# Patient Record
Sex: FEMALE | Race: WHITE | NOT HISPANIC OR LATINO | Employment: UNEMPLOYED | URBAN - NONMETROPOLITAN AREA
[De-identification: names, ages, dates, MRNs, and addresses within clinical notes are randomized per-mention and may not be internally consistent; named-entity substitution may affect disease eponyms.]

---

## 2017-02-18 ENCOUNTER — OFFICE VISIT (OUTPATIENT)
Dept: RETAIL CLINIC | Facility: CLINIC | Age: 42
End: 2017-02-18

## 2017-02-18 VITALS — OXYGEN SATURATION: 98 % | WEIGHT: 231 LBS | TEMPERATURE: 98.9 F | HEART RATE: 101 BPM | RESPIRATION RATE: 18 BRPM

## 2017-02-18 DIAGNOSIS — R21 RASH: Primary | ICD-10-CM

## 2017-02-18 PROCEDURE — 99202 OFFICE O/P NEW SF 15 MIN: CPT | Performed by: NURSE PRACTITIONER

## 2017-02-18 NOTE — PROGRESS NOTES
Rash      Subjective   Chaparrita Reynoso is a 41 y.o. female.     Rash   This is a new problem. Episode onset: 3 days ago  The rash is characterized by itchiness and dryness. She was exposed to nothing. Pertinent negatives include no congestion, cough, diarrhea, fatigue, fever, shortness of breath, sore throat or vomiting. Treatments tried: Used Lotrimin cream last night.   The treatment provided no relief.       There is no problem list on file for this patient.      Allergies   Allergen Reactions   • Metformin And Related GI Intolerance        No current outpatient prescriptions on file prior to visit.     No current facility-administered medications on file prior to visit.        Past Medical History   Diagnosis Date   • Cancer      uterine   • Type 2 diabetes mellitus        Past Surgical History   Procedure Laterality Date   • Hysterectomy  07/2015   • Cholecystectomy  02/2000   • Total abdominal hysterectomy with salpingo oophorectomy         Family History   Problem Relation Age of Onset   • No Known Problems Mother    • No Known Problems Father    • No Known Problems Brother        Social History     Social History   • Marital status:      Spouse name: N/A   • Number of children: N/A   • Years of education: N/A     Occupational History   • Not on file.     Social History Main Topics   • Smoking status: Never Smoker   • Smokeless tobacco: Never Used   • Alcohol use No   • Drug use: No   • Sexual activity: Defer     Other Topics Concern   • Not on file     Social History Narrative   • No narrative on file       Travel:  No recent travel within the last 21 days outside the [place] Denies recent travel to one of the following [place]ries:  [place], [place], [place], or [place].  Denies contact with anyone who has traveled to one of the following [place]  Countries: [place], [place], [place], or [place] within the last 21 days and is known or suspected to have Ebola.  Denies having had any  contact with the human remains, blood or any bodily fluids of someone who is known or suspected to have Ebola within the last 21 days.     OB History     No data available          Review of Systems   Constitutional: Negative for chills, diaphoresis, fatigue and fever.   HENT: Negative for congestion, ear discharge, ear pain, mouth sores, nosebleeds, postnasal drip, sneezing, sore throat and voice change.    Respiratory: Negative for cough, shortness of breath and wheezing.    Cardiovascular: Negative for chest pain.   Gastrointestinal: Negative for abdominal pain, diarrhea, nausea and vomiting.   Musculoskeletal: Negative for myalgias.   Skin: Positive for rash.   Neurological: Negative for dizziness and headaches.       Visit Vitals   • Pulse 101   • Temp 98.9 °F (37.2 °C) (Oral)   • Resp 18   • Wt 231 lb (105 kg)   • LMP 07/01/2015 (Approximate)   • SpO2 98%   • Breastfeeding No       Objective   Physical Exam   Constitutional: She is oriented to person, place, and time. She appears well-developed and well-nourished. She is cooperative. She does not appear ill. No distress.   Obese    Cardiovascular: Regular rhythm and normal heart sounds.  Tachycardia present.    Pulmonary/Chest: Effort normal and breath sounds normal.   Neurological: She is alert and oriented to person, place, and time.   Skin: Skin is warm, dry and intact.            Assessment/Plan   Chaparrita was seen today for rash.    Diagnoses and all orders for this visit:    Rash    Other orders  -     triamcinolone (KENALOG) 0.1 % ointment; Apply  topically 3 (Three) Times a Day for 7 days.    Discussed that these appear to be new stretch marks forming.  Advised her to moisturize her skin liberally and to use the steroid cream if itching.      No results found for this or any previous visit.    Return if symptoms worsen or fail to improve.

## 2017-02-18 NOTE — PATIENT INSTRUCTIONS
Rash  A rash is a change in the color of the skin. A rash can also change the way your skin feels. There are many different conditions and factors that can cause a rash.  HOME CARE INSTRUCTIONS  Pay attention to any changes in your symptoms. Follow these instructions to help with your condition:   Medicine  Take or apply over-the-counter and prescription medicines only as told by your health care provider. These may include:  · Corticosteroid cream.  · Anti-itch lotions.  · Oral antihistamines.  Skin Care  · Apply cool compresses to the affected areas.  · Try taking a bath with:    Epsom salts. Follow the instructions on the packaging. You can get these at your local pharmacy or grocery store.    Baking soda. Pour a small amount into the bath as told by your health care provider.    Colloidal oatmeal. Follow the instructions on the packaging. You can get this at your local pharmacy or grocery store.  · Try applying baking soda paste to your skin. Stir water into baking soda until it reaches a paste-like consistency.  · Do not scratch or rub your skin.  · Avoid covering the rash. Make sure the rash is exposed to air as much as possible.  General Instructions  · Avoid hot showers or baths, which can make itching worse. A cold shower may help.  · Avoid scented soaps, detergents, and perfumes. Use gentle soaps, detergents, perfumes, and other cosmetic products.  · Avoid any substance that causes your rash. Keep a journal to help track what causes your rash. Write down:    What you eat.    What cosmetic products you use.    What you drink.    What you wear. This includes jewelry.  · Keep all follow-up visits as told by your health care provider. This is important.  SEEK MEDICAL CARE IF:  · You sweat at night.  · You lose weight.  · You urinate more than normal.  · You feel weak.  · You vomit.  · Your skin or the whites of your eyes look yellow (jaundice).  · Your skin:    Tingles.    Is numb.  · Your rash:    Does not go  "away after several days.    Gets worse.  · You are:    Unusually thirsty.    More tired than normal.  · You have:    New symptoms.    Pain in your abdomen.    A fever.    Diarrhea.  SEEK IMMEDIATE MEDICAL CARE IF:  · You develop a rash that covers all or most of your body. The rash may or may not be painful.  · You develop blisters that:    Are on top of the rash.    Grow larger or grow together.    Are painful.    Are inside your nose or mouth.  · You develop a rash that:    Looks like purple pinprick-sized spots all over your body.    Has a \"bull's eye\" or looks like a target.    Is not related to sun exposure, is red and painful, and causes your skin to peel.     This information is not intended to replace advice given to you by your health care provider. Make sure you discuss any questions you have with your health care provider.     Document Released: 12/08/2003 Document Revised: 11/28/2016 Document Reviewed: 05/04/2016  [employer] Interactive Patient Education ©2016 [employer]    "

## 2023-11-03 ENCOUNTER — TELEPHONE (OUTPATIENT)
Dept: OBSTETRICS AND GYNECOLOGY | Facility: CLINIC | Age: 48
End: 2023-11-03

## 2023-11-03 NOTE — TELEPHONE ENCOUNTER
Caller: Chaparrita Reynoso    Relationship: Self    Best call back number: [phone]    What is the best time to reach you: ANYTIME    Who are you requesting to speak with (clinical staff, provider,  specific staff member): NOT SURE     Do you know the name of the person who called: NO    What was the call regarding: NOT SURE

## 2023-11-08 NOTE — PROGRESS NOTES
[facility] Cardiology   Consult  Chaparrita Reynoso  1975    VISIT DATE:  11/09/23    PCP:   Naima Vasquez MD  [address] [facility] [address]        CC:  Rapid Heart Rate (New Patient)      Problem List:  DM, Type 2  HTN  Hyperlipidemia  PCOS  DJD  HYST      Echo August 2023:  Hyperdynamic LV function, moderate to severe LVH, EF 55 to 60%, no significant valvular disease      Holter:  no significant ectopy or arrhythmias, avg HR 96    History of Present Illness:  Chaparrita Reynoso  Is a 48 y.o. female with pertinent cardiac history detailed above.  Patient was incidentally noted to be tachycardic at routine visits with Dr. Vasquez.  These were episodes of sinus tachycardia.  She wore a Holter monitor which showed an average heart rate of 96 and no significant ectopy or arrhythmias.  She was put on metoprolol to help decrease her heart rate and this has caused some improvement.  She was largely asymptomatic.  She denies chest pain dyspnea palpitations.  Has not had episodes of hypoglycemia or syncope.  TSH and other basic labs were normal.  She does have a significantly elevated hemoglobin A1c.  He had an echo showing normal ejection fraction with LVH but no significant valve disease.      There are no problems to display for this patient.      Allergies   Allergen Reactions    Metformin And Related GI Intolerance       Social History     Socioeconomic History    Marital status:    Tobacco Use    Smoking status: Never    Smokeless tobacco: Never   Substance and Sexual Activity    Alcohol use: No    Drug use: No    Sexual activity: Defer     Birth control/protection: Surgical       Family History   Problem Relation Age of Onset    No Known Problems Mother     No Known Problems Father     No Known Problems Brother        Current Medications:    Current Outpatient Medications:     atorvastatin (LIPITOR) 20 MG tablet, Take 1 tablet by mouth Daily., Disp: 90 tablet, Rfl: 3    Blood Glucose Monitoring  "Suppl (FreeStyle Lite) w/Device kit, See Admin Instructions., Disp: , Rfl:     FREESTYLE LITE test strip, TEST ONCE A DAY, Disp: , Rfl:     Lancets (freestyle) lancets, TEST ONCE A DAY, Disp: , Rfl:     Lantus 100 UNIT/ML injection, INJECT 10 UNITS SUBCUTANEOUSLY EVERY DAY, Disp: , Rfl:     lidocaine (LIDODERM) 5 %, APPLY ONE PATCH FOR 12 HOURS AND REMOVE FOR 12 HOURS, Disp: , Rfl:     metoprolol tartrate (LOPRESSOR) 25 MG tablet, TAKE 1/2 TABLET TWICE DAILY WITH FOOD, Disp: , Rfl:     SURE COMFORT INS SYR .5CC/30G 30G X 5/16\" 0.5 ML misc, USE with insulin injections, Disp: , Rfl:     vitamin D (ERGOCALCIFEROL) 1.25 MG (40482 UT) capsule capsule, Take 1 capsule by mouth 1 (One) Time Per Week., Disp: , Rfl:     SITagliptin (JANUVIA) 100 MG tablet, Take 1 tablet by mouth Daily. (Patient not taking: Reported on 11/9/2023), Disp: , Rfl:      Review of Systems   Cardiovascular:  Negative for chest pain, dyspnea on exertion, irregular heartbeat and palpitations.   Respiratory:  Negative for shortness of breath.        Vitals:    11/09/23 1053   BP: 118/70   BP Location: Left arm   Patient Position: Sitting   Pulse: 102   SpO2: 97%   Weight: 77.1 kg (170 lb)   Height: 166.4 cm (65.5\")       Physical Exam  Constitutional:       Appearance: Normal appearance.   Neck:      Vascular: No carotid bruit.   Cardiovascular:      Rate and Rhythm: Normal rate and regular rhythm.      Pulses: Normal pulses.      Heart sounds: Normal heart sounds.   Pulmonary:      Effort: Pulmonary effort is normal.      Breath sounds: Normal breath sounds.   Musculoskeletal:      Right lower leg: No edema.      Left lower leg: No edema.   Neurological:      General: No focal deficit present.      Mental Status: She is alert.         Diagnostic Data:    ECG 12 Lead    Date/Time: 11/9/2023 11:19 AM  Performed by: Nik Hinson MD    Authorized by: Nik Hinson MD  Comparison: compared with previous ECG from 8/16/2023  Rhythm: sinus " "rhythm  Rate: normal  BPM: 94  QRS axis: normal  Other findings: low voltage        No results found for: \"CHLPL\", \"TRIG\", \"HDL\", \"LDLDIRECT\"  No results found for: \"GLUCOSE\", \"BUN\", \"CREATININE\", \"NA\", \"K\", \"CL\", \"CO2\"  No results found for: \"HGBA1C\"  No results found for: \"WBC\", \"HGB\", \"HCT\", \"PLT\"    Assessment:  No diagnosis found.    Plan:      Hypertension  Creatinine 0.49, potassium 4.6  LVH on echo  Currently controlled on metoprolol alone, continue to monitor  Hyperlipidemia  Total cholesterol 224, triglycerides 215, , HDL 58  Starting lipitor 20mg goal LDL less than 70  Diabetes  A1c 11.7  She is getting a follow up A1c next week  No current angina  Managed by Dr. Vasquez  Elevated heart rates  Consistent with sinus tachycardia  Normal thyroid function  Holter with rare ectopy, average heart rate 96  Normal echo  Agree with continuing low-dose metoprolol at this time.  Patient is asymptomatic      Continue to follow patient for cardiac risk factors.  Encouraged regular exercise and adhering to a heart healthy diet    Advance Care Planning   ACP discussion was held with the patient during this visit. Patient does not have an advance directive, declines further assistance.       Nik Hinson MD FAC     "

## 2023-11-09 ENCOUNTER — OFFICE VISIT (OUTPATIENT)
Dept: CARDIOLOGY | Facility: CLINIC | Age: 48
End: 2023-11-09
Payer: COMMERCIAL

## 2023-11-09 VITALS
SYSTOLIC BLOOD PRESSURE: 118 MMHG | DIASTOLIC BLOOD PRESSURE: 70 MMHG | OXYGEN SATURATION: 97 % | BODY MASS INDEX: 27.32 KG/M2 | HEART RATE: 102 BPM | WEIGHT: 170 LBS | HEIGHT: 66 IN

## 2023-11-09 DIAGNOSIS — R00.0 SINUS TACHYCARDIA: Primary | ICD-10-CM

## 2023-11-09 RX ORDER — LANCETS 28 GAUGE
EACH MISCELLANEOUS
COMMUNITY
Start: 2023-08-23

## 2023-11-09 RX ORDER — LIDOCAINE 50 MG/G
PATCH TOPICAL
COMMUNITY
Start: 2023-10-18

## 2023-11-09 RX ORDER — ERGOCALCIFEROL 1.25 MG/1
50000 CAPSULE ORAL WEEKLY
COMMUNITY
Start: 2023-10-13

## 2023-11-09 RX ORDER — LISINOPRIL 5 MG/1
1 TABLET ORAL DAILY
COMMUNITY
Start: 2023-09-15 | End: 2023-11-09 | Stop reason: ALTCHOICE

## 2023-11-09 RX ORDER — LISINOPRIL 10 MG/1
1 TABLET ORAL DAILY
COMMUNITY
Start: 2023-08-30 | End: 2023-11-09

## 2023-11-09 RX ORDER — BLOOD-GLUCOSE METER
KIT MISCELLANEOUS SEE ADMIN INSTRUCTIONS
COMMUNITY
Start: 2023-08-23

## 2023-11-09 RX ORDER — INSULIN GLARGINE 100 [IU]/ML
INJECTION, SOLUTION SUBCUTANEOUS
COMMUNITY
Start: 2023-08-23

## 2023-11-09 RX ORDER — BLOOD-GLUCOSE METER
KIT MISCELLANEOUS
COMMUNITY
Start: 2023-08-23

## 2023-11-09 RX ORDER — ATORVASTATIN CALCIUM 20 MG/1
20 TABLET, FILM COATED ORAL DAILY
Qty: 90 TABLET | Refills: 3 | Status: SHIPPED | OUTPATIENT
Start: 2023-11-09

## 2023-11-09 RX ORDER — ATORVASTATIN CALCIUM 20 MG/1
20 TABLET, FILM COATED ORAL DAILY
COMMUNITY
End: 2023-11-09 | Stop reason: SDUPTHER

## 2024-04-05 ENCOUNTER — OFFICE VISIT (OUTPATIENT)
Dept: NEUROSURGERY | Facility: CLINIC | Age: 49
End: 2024-04-05
Payer: COMMERCIAL

## 2024-04-05 VITALS — WEIGHT: 184.8 LBS | BODY MASS INDEX: 29.7 KG/M2 | TEMPERATURE: 97.8 F | HEIGHT: 66 IN

## 2024-04-05 DIAGNOSIS — M51.36 DDD (DEGENERATIVE DISC DISEASE), LUMBAR: ICD-10-CM

## 2024-04-05 DIAGNOSIS — M51.16 LUMBAR DISC HERNIATION WITH RADICULOPATHY: Primary | ICD-10-CM

## 2024-04-05 PROCEDURE — 99203 OFFICE O/P NEW LOW 30 MIN: CPT | Performed by: NEUROLOGICAL SURGERY

## 2024-04-05 PROCEDURE — 1160F RVW MEDS BY RX/DR IN RCRD: CPT | Performed by: NEUROLOGICAL SURGERY

## 2024-04-05 PROCEDURE — 1159F MED LIST DOCD IN RCRD: CPT | Performed by: NEUROLOGICAL SURGERY

## 2024-04-05 RX ORDER — MELOXICAM 15 MG/1
15 TABLET ORAL
COMMUNITY
Start: 2024-03-28

## 2024-04-05 RX ORDER — METHOCARBAMOL 750 MG/1
750 TABLET, FILM COATED ORAL
COMMUNITY
Start: 2024-03-28

## 2024-04-05 RX ORDER — FAMOTIDINE 20 MG/1
20 TABLET, FILM COATED ORAL EVERY 12 HOURS
COMMUNITY
Start: 2024-03-07

## 2024-04-05 NOTE — PROGRESS NOTES
Patient: Chaparrita Reynoso  : 1975    Primary Care Provider: Naima Vasquez MD    Requesting Provider: As above        History    Chief Complaint: Back and left leg pain.    History of Present Illness: Ms. Reynoso is a 48-year-old unemployed woman who describes 20 years of intermittent back difficulty.  She tends to have spells.  Her last spell occurred in the summer of last year and was horrible.  She did some therapy.  The pain was extending into the back of her left leg.  When the pain was present the only position that was comfortable was lying down.  Sitting, standing, bending more aggravating.  At this point the pain is let up.    Review of Systems   Constitutional:  Negative for activity change, appetite change, chills, diaphoresis, fatigue, fever and unexpected weight change.   HENT:  Negative for congestion, dental problem, drooling, ear discharge, ear pain, facial swelling, hearing loss, mouth sores, nosebleeds, postnasal drip, rhinorrhea, sinus pressure, sinus pain, sneezing, sore throat, tinnitus, trouble swallowing and voice change.    Eyes:  Negative for photophobia, pain, discharge, redness, itching and visual disturbance.   Respiratory:  Negative for apnea, cough, choking, chest tightness, shortness of breath, wheezing and stridor.    Cardiovascular:  Negative for chest pain, palpitations and leg swelling.   Gastrointestinal:  Negative for abdominal distention, abdominal pain, anal bleeding, blood in stool, constipation, diarrhea, nausea, rectal pain and vomiting.   Endocrine: Negative for cold intolerance, heat intolerance, polydipsia, polyphagia and polyuria.   Genitourinary:  Negative for decreased urine volume, difficulty urinating, dyspareunia, dysuria, enuresis, flank pain, frequency, genital sores, hematuria, menstrual problem, pelvic pain, urgency, vaginal bleeding, vaginal discharge and vaginal pain.   Musculoskeletal:  Positive for arthralgias, back pain and joint swelling. Negative for  "gait problem, myalgias, neck pain and neck stiffness.   Skin:  Negative for color change, pallor, rash and wound.   Allergic/Immunologic: Negative for environmental allergies, food allergies and immunocompromised state.   Neurological:  Positive for weakness and numbness. Negative for dizziness, tremors, seizures, syncope, facial asymmetry, speech difficulty, light-headedness and headaches.   Hematological:  Negative for adenopathy. Does not bruise/bleed easily.   Psychiatric/Behavioral:  Negative for agitation, behavioral problems, confusion, decreased concentration, dysphoric mood, hallucinations, self-injury, sleep disturbance and suicidal ideas. The patient is not nervous/anxious and is not hyperactive.      The patient's past medical history, past surgical history, family history, and social history have been reviewed at length in the electronic medical record.      Physical Exam:   Temp 97.8 °F (36.6 °C) (Infrared)   Ht 166.4 cm (65.5\")   Wt 83.8 kg (184 lb 12.8 oz)   LMP 07/01/2015 (Approximate)   BMI 30.28 kg/m²   CONSTITUTIONAL: Patient is well-nourished, pleasant and appears stated age.  MUSCULOSKELETAL:  Straight leg raising is negative.  Benny's Sign is negative.  ROM in the low back is normal.  Tenderness in the back to palpation is not observed.  NEUROLOGICAL:  Orientation, memory, attention span, language function, and cognition have been examined and are intact.  Strength is intact in the lower extremities to direct testing.  Muscle tone is normal throughout.  Station and gait are normal.  Sensation is intact to light touch testing throughout.  Deep tendon reflexes are 2+ and symmetrical.  Coordination is intact.      Medical Decision Making    Data Review:   (All imaging studies were personally reviewed unless stated otherwise)  MRI of the lumbar spine dated 3/15/2024 demonstrates isolated degenerative disc disease at L5-S1 where there is a prominent bulge or small disc herniation on the left. "  There is mild diffuse facet arthropathy.    Diagnosis:   1.  Chronic mechanical low back pain.  2.  Lumbar degenerative disc disease.  3.  Lumbar radiculopathy, resolved.    Treatment Options:   I have gone over her MRI with her and explained the natural history of her condition.  If she develops recurrent radicular symptoms then surgical treatment would be a consideration.  Surgery is unlikely to improve or prevent further back pain.  She will follow-up with neurosurgery on an as-needed basis.      Scribed for Jose Alberto Lai MD by Didi Eller CMA on 4/5/2024 14:56 EDT       I, Dr. Lai, personally performed the services described in the documentation, as scribed in my presence, and it is both accurate and complete.

## 2025-06-05 ENCOUNTER — TELEPHONE (OUTPATIENT)
Dept: GASTROENTEROLOGY | Facility: CLINIC | Age: 50
End: 2025-06-05
Payer: COMMERCIAL

## 2025-06-05 ENCOUNTER — OFFICE VISIT (OUTPATIENT)
Dept: GASTROENTEROLOGY | Facility: CLINIC | Age: 50
End: 2025-06-05
Payer: COMMERCIAL

## 2025-06-05 VITALS
SYSTOLIC BLOOD PRESSURE: 122 MMHG | HEIGHT: 65 IN | TEMPERATURE: 97.3 F | WEIGHT: 208 LBS | DIASTOLIC BLOOD PRESSURE: 70 MMHG | OXYGEN SATURATION: 97 % | HEART RATE: 101 BPM | BODY MASS INDEX: 34.66 KG/M2

## 2025-06-05 DIAGNOSIS — R74.8 ELEVATED LIVER ENZYMES: Primary | ICD-10-CM

## 2025-06-05 DIAGNOSIS — Z12.11 COLON CANCER SCREENING: ICD-10-CM

## 2025-06-05 DIAGNOSIS — K21.9 GASTROESOPHAGEAL REFLUX DISEASE, UNSPECIFIED WHETHER ESOPHAGITIS PRESENT: ICD-10-CM

## 2025-06-05 RX ORDER — HYDROXYCHLOROQUINE SULFATE 200 MG/1
1 TABLET, FILM COATED ORAL DAILY
COMMUNITY
Start: 2025-05-09

## 2025-06-05 RX ORDER — SEMAGLUTIDE 1.34 MG/ML
1 INJECTION, SOLUTION SUBCUTANEOUS WEEKLY
COMMUNITY
Start: 2025-05-27

## 2025-06-05 RX ORDER — ONDANSETRON 4 MG/1
4 TABLET, FILM COATED ORAL EVERY 8 HOURS PRN
COMMUNITY
Start: 2025-04-10

## 2025-06-05 NOTE — PROGRESS NOTES
New Patient Consult      Date: 2025   Patient Name: Chaparrita Reynoso  MRN: [id number]  : 1975     Primary Care Provider: Naima Vasquez MD  Referring Provider: Pedro    Chief Complaint   Patient presents with    Elevated Hepatic Enzymes     History of Present Illness: Chaparrita Reynoso is a 49 y.o. female who is here today as a consultation with Gastroenterology for evaluation of Elevated Hepatic Enzymes.    Was told recently by PCP that she has elevated liver enzymes. She is not sure about how long she has had elevated liver enzymes.  Alcoholism no current alcohol use.  No risky behaviors including illicit drug use or known frictional tattoos.  She did have an US abd about 1 year ago at [facility], told normal.    Reports nausea present and some mild heartburn while on the ozempic.  Symptoms better after taking Pepcid 20 mg once daily.  No current abdominal pain.  No current constipation or diarrhea.  No rectal bleeding. No dysphagia.     No prior colonoscopy. Had cologuard with PCP, approx 1 year ago and negative. There is no known family history of colon cancer or colon polyps.    Was told by rheumatologist that she could have lupus. Diabetic since approx , on ozempic, glucose has been better recently. She reports fasting glucose at home around 90s-120s.    Subjective      Past Medical History:   Diagnosis Date    Cancer     uterine    GERD (gastroesophageal reflux disease)     History of EKG     History of Holter monitoring 2023    Hyperlipidemia     Low back pain     Lupus (systemic lupus erythematosus)     Out side doctor is treating for lupus but patient has not been diagnosed yet    Microalbuminuria     Obesity     PCOS (polycystic ovarian syndrome)     Shoulder pain, left     Tachycardia     Type 2 diabetes mellitus      Past Surgical History:   Procedure Laterality Date    CHOLECYSTECTOMY  2000    HYSTERECTOMY  2015    TOTAL ABDOMINAL HYSTERECTOMY WITH SALPINGO  "OOPHORECTOMY       Family History   Problem Relation Age of Onset    No Known Problems Mother     No Known Problems Father     No Known Problems Brother     Colon cancer Neg Hx      Social History     Socioeconomic History    Marital status:    Tobacco Use    Smoking status: Never    Smokeless tobacco: Never   Substance and Sexual Activity    Alcohol use: No    Drug use: No    Sexual activity: Defer     Birth control/protection: Surgical     Current Outpatient Medications:     atorvastatin (LIPITOR) 20 MG tablet, Take 1 tablet by mouth Daily., Disp: 90 tablet, Rfl: 3    Blood Glucose Monitoring Suppl (FreeStyle Lite) w/Device kit, See Admin Instructions., Disp: , Rfl:     famotidine (PEPCID) 20 MG tablet, Take 1 tablet by mouth Every 12 (Twelve) Hours., Disp: , Rfl:     FREESTYLE LITE test strip, TEST ONCE A DAY, Disp: , Rfl:     hydroxychloroquine (PLAQUENIL) 200 MG tablet, Take 1 tablet by mouth Daily., Disp: , Rfl:     Lancets (freestyle) lancets, TEST ONCE A DAY, Disp: , Rfl:     Lantus 100 UNIT/ML injection, INJECT 10 UNITS SUBCUTANEOUSLY EVERY DAY, Disp: , Rfl:     metoprolol tartrate (LOPRESSOR) 25 MG tablet, TAKE 1/2 TABLET TWICE DAILY WITH FOOD, Disp: , Rfl:     ondansetron (ZOFRAN) 4 MG tablet, Take 1 tablet by mouth Every 8 (Eight) Hours As Needed., Disp: , Rfl:     Ozempic, 1 MG/DOSE, 4 MG/3ML solution pen-injector, Inject 1 mg under the skin into the appropriate area as directed 1 (One) Time Per Week., Disp: , Rfl:     SURE COMFORT INS SYR .5CC/30G 30G X 5/16\" 0.5 ML misc, USE with insulin injections, Disp: , Rfl:     vitamin D (ERGOCALCIFEROL) 1.25 MG (04900 UT) capsule capsule, Take 1 capsule by mouth 1 (One) Time Per Week., Disp: , Rfl:     Allergies   Allergen Reactions    Metformin And Related GI Intolerance     The following portions of the patient's history were reviewed and updated as appropriate: allergies, current medications, past family history, past medical history, past social " "history, past surgical history and problem list.    Objective     Physical Exam  Constitutional:       General: She is not in acute distress.     Appearance: Normal appearance. She is well-developed. She is obese. She is not diaphoretic.   HENT:      Head: Normocephalic and atraumatic.      Right Ear: External ear normal.      Left Ear: External ear normal.      Nose: Nose normal.   Eyes:      General: No scleral icterus.        Right eye: No discharge.         Left eye: No discharge.      Conjunctiva/sclera: Conjunctivae normal.   Neck:      Trachea: No tracheal deviation.   Pulmonary:      Effort: Pulmonary effort is normal. No respiratory distress.   Abdominal:      General: Bowel sounds are normal. There is no distension.      Palpations: Abdomen is soft. There is no mass.      Tenderness: There is no abdominal tenderness. There is no guarding.   Musculoskeletal:         General: Normal range of motion.      Cervical back: Normal range of motion.   Skin:     Coloration: Skin is not pale.      Findings: No erythema or rash.   Neurological:      Mental Status: She is alert and oriented to person, place, and time.      Coordination: Coordination normal.   Psychiatric:         Mood and Affect: Mood normal.         Behavior: Behavior normal.         Thought Content: Thought content normal.         Judgment: Judgment normal.         Vitals:    06/05/25 0958   BP: 122/70   Pulse: 101   Temp: 97.3 °F (36.3 °C)   TempSrc: Infrared   SpO2: 97%   Weight: 94.3 kg (208 lb)   Height: 165.1 cm (65\")     Results Review:   I have reviewed the patient's new clinical and imaging results.    Labs 6/3/2024 TSH 2.70, free T41.24, vitamin D 17.3 low, total cholesterol 169, glucose 197, creatinine 0.54, triglycerides 155, total bilirubin 0.7, alkaline phosphatase 161, albumin 4.3, ALT 45, AST 46, sodium 139, vitamin B12 915, hemoglobin A1c 8.9, white blood count 6.7, hemoglobin 16.1, hematocrit 46.3, MCV 86.5, platelets 249,000.    No " recent abdominal imaging on file.    Assessment / Plan      1. Elevated liver enzymes  Labs 6-2024 with elevated ALT 45, AST 46, alkaline phosphatase 161.  Total bilirubin normal 0.7.  Platelets normal 249,000.  Cholesterol normal.  No gross abdominal imaging on file.  Patient reports ultrasound of the abdomen done in 2024 and told normal.  No other lab results on file to compare.  No history of alcoholism.  BMI is 34.  She has type 2 diabetes mellitus, on Ozempic now and insulin.  She reports her glucose has been better recently.  No known prior history of any liver disease.  Based on her mildly elevated liver enzymes, known type 2 diabetes and increased BMI, suspect she has fatty liver disease, others to rule out.    Will request US abd report from [facility] for review  Fasting labs  Work on weight loss, goal to lose 20 lbs in the next 6 months  Mediterranean diet    - A1A, Quant & Genotype (Rfx Pheno); Future  - Anti-Smooth Muscle Antibody Titer; Future  - Ceruloplasmin; Future  - Ferritin; Future  - Hepatitis A Antibody, Total; Future  - Hepatitis B Core Antibody, Total; Future  - Hepatitis B Surface Antibody; Future  - Hepatitis B Surface Antigen; Future  - IgG; Future  - Iron Profile w/o Ferritin; Future  - Mitochondrial Antibodies, M2; Future  - TRAN Fibrosure Plus; Future  - SHAY by IFA, Reflex to Titer and Pattern; Future  - Protime-INR; Future  - Tissue Transglutaminase, IgA; Future  - IgA; Future  - HCV Antibody Rfx To Qnt PCR; Future  - Comprehensive Metabolic Panel; Future  - CBC (No Diff); Future    2. Gastroesophageal reflux disease, unspecified whether esophagitis present  Reports nausea present and some mild heartburn while on the ozempic.  Symptoms better after taking Pepcid 20 mg once daily.  No current abdominal pain.  No current constipation or diarrhea.  No rectal bleeding. No dysphagia.     Acid reflux measures  Small portions at meals times  Consider EGD in the future  Continue  Pepcid 20 mg once daily    3. Colon cancer screening  No prior colonoscopy. Had cologuard with PCP, approx 1 year ago and negative. There is no known family history of colon cancer or colon polyps.    Will request cologuard result from PCP to determine date of next recommended screening      Follow Up:   Return in about 3 months (around 9/5/2025) for recheck liver disease, discussion of results.    Jyothi Lawrence PA-C  [facility] [place]  6/5/2025  15:25 EDT

## 2025-06-05 NOTE — TELEPHONE ENCOUNTER
Called PCP office; LVM requesting most recent labs, ultrasound and Cologuard results be faxed to office.

## 2025-06-06 ENCOUNTER — PATIENT ROUNDING (BHMG ONLY) (OUTPATIENT)
Dept: GASTROENTEROLOGY | Facility: CLINIC | Age: 50
End: 2025-06-06
Payer: COMMERCIAL

## 2025-06-06 NOTE — PROGRESS NOTES
June 6, 2025    Hello, may I speak with Chaparrita Reynoso?    My name is Carmen - no VM    I am  with [facility]  [address]  [facility] [address] [phone].    Before we get started may I verify your date of birth? 1975    I am calling to officially welcome you to our practice and ask about your recent visit. Is this a good time to talk? no    Tell me about your visit with us. What things went well?  No VM       We're always looking for ways to make our patients' experiences even better. Do you have recommendations on ways we may improve?  no    Overall were you satisfied with your first visit to our practice? yes       I appreciate you taking the time to speak with me today. Is there anything else I can do for you? no      Thank you, and have a great day.